# Patient Record
Sex: FEMALE | Race: WHITE | Employment: UNEMPLOYED | RURAL
[De-identification: names, ages, dates, MRNs, and addresses within clinical notes are randomized per-mention and may not be internally consistent; named-entity substitution may affect disease eponyms.]

---

## 2021-11-04 LAB — MAMMOGRAPHY, EXTERNAL: NORMAL

## 2022-07-27 LAB — HEMOCCULT STL QL: NEGATIVE

## 2022-12-06 ENCOUNTER — OFFICE VISIT (OUTPATIENT)
Dept: FAMILY MEDICINE CLINIC | Age: 62
End: 2022-12-06
Payer: COMMERCIAL

## 2022-12-06 VITALS
DIASTOLIC BLOOD PRESSURE: 72 MMHG | BODY MASS INDEX: 23.13 KG/M2 | OXYGEN SATURATION: 97 % | HEIGHT: 65 IN | WEIGHT: 138.8 LBS | HEART RATE: 61 BPM | RESPIRATION RATE: 18 BRPM | TEMPERATURE: 98.1 F | SYSTOLIC BLOOD PRESSURE: 110 MMHG

## 2022-12-06 DIAGNOSIS — G89.29 CHRONIC PAIN OF LEFT ANKLE: Primary | ICD-10-CM

## 2022-12-06 DIAGNOSIS — Z13.9 SCREENING DUE: ICD-10-CM

## 2022-12-06 DIAGNOSIS — M25.572 CHRONIC PAIN OF LEFT ANKLE: Primary | ICD-10-CM

## 2022-12-06 PROCEDURE — 99202 OFFICE O/P NEW SF 15 MIN: CPT | Performed by: FAMILY MEDICINE

## 2022-12-06 NOTE — PROGRESS NOTES
Identified pt with two pt identifiers(name and ). Chief Complaint   Patient presents with    Referral Request     Patient would like referral for OBGYN and mammogram     New Patient    Ankle Pain     Left ankle pain and swelling by the end of the day since august         Health Maintenance Due   Topic    Hepatitis C Screening     Depression Screen     Cervical cancer screen     Lipid Screen     Colorectal Cancer Screening Combo     Breast Cancer Screen Mammogram     COVID-19 Vaccine (3 - Booster for Pfizer series)    Flu Vaccine (1)       Wt Readings from Last 3 Encounters:   22 138 lb 12.8 oz (63 kg)     Temp Readings from Last 3 Encounters:   22 98.1 °F (36.7 °C) (Temporal)     BP Readings from Last 3 Encounters:   22 110/72     Pulse Readings from Last 3 Encounters:   22 61         Learning Assessment:  :     No flowsheet data found. Depression Screening:  :     3 most recent PHQ Screens 2022   Little interest or pleasure in doing things Not at all   Feeling down, depressed, irritable, or hopeless Not at all   Total Score PHQ 2 0       Fall Risk Assessment:  :     No flowsheet data found. Abuse Screening:  :     Abuse Screening Questionnaire 2022   Do you ever feel afraid of your partner? N   Are you in a relationship with someone who physically or mentally threatens you? N   Is it safe for you to go home? Y       Coordination of Care Questionnaire:  :     1) Have you been to an emergency room, urgent care clinic since your last visit? no   Hospitalized since your last visit? no             2) Have you seen or consulted any other health care providers outside of [address] [place] [address] since your last visit? yes  Shasta Burgess 2) MD Ranjith (Include any pap smears or colon screenings in this section.)    3) Do you have an Advance Directive on file? yes  Are you interested in receiving information about Advance Directives?  yes    Patient is accompanied by self I have received verbal consent from Araseli Sal to discuss any/all medical information while they are present in the room. 4.  For patients aged 39-70: Has the patient had a colonoscopy / FIT/ Cologuard? yes      If the patient is female:    5. For patients aged 41-77: Has the patient had a mammogram within the past 2 years? No      6. For patients aged 21-65: Has the patient had a pap smear?  No

## 2022-12-12 NOTE — PROGRESS NOTES
Andree Mendoza (: 1960) is a 58 y.o. female, new patient, here for evaluation of the following chief complaint(s):  New Patient and Ankle Pain (Left ankle pain and swelling by the end of the day since august )       ASSESSMENT/PLAN:  Below is the assessment and plan developed based on review of pertinent history, physical exam, labs, studies, and medications. 1. Chronic pain of left ankle  2. Screening due  -     [facility] MAMMO BI SCREENING INCL CAD; Future      No follow-ups on file. SUBJECTIVE/OBJECTIVE:  Andree Mendoza is a 58 y.o. female presents to the clinic to establish care, patient states that she is up to date on her preventative medicine, has had mammograms, colonoscopy, blood work. Denies any abnormal findings in the past. Is not on any medications. Complains of a left ankle pain after an injury in or around August, notices swelling of the ankle joint at the end of the day. Experiences pain during the first few steps in the morning, which resolves on its own. Review of Systems   Constitutional:  Negative for chills, fatigue, fever and unexpected weight change. HENT: Negative. Eyes: Negative. Respiratory: Negative. Cardiovascular:  Negative for leg swelling. Gastrointestinal: Negative. Endocrine: Negative. Genitourinary: Negative. Musculoskeletal:  Positive for arthralgias and joint swelling. Skin: Negative. Allergic/Immunologic: Negative. Neurological: Negative. Hematological: Negative. Psychiatric/Behavioral: Negative. Physical Exam  Constitutional:       Appearance: Normal appearance. She is normal weight. HENT:      Head: Normocephalic and atraumatic. Right Ear: External ear normal.      Left Ear: External ear normal.      Nose: Nose normal.      Mouth/Throat:      Mouth: Mucous membranes are dry. Pharynx: Oropharynx is clear. Eyes:      Extraocular Movements: Extraocular movements intact.       Conjunctiva/sclera: Conjunctivae normal.      Pupils: Pupils are equal, round, and reactive to light. Cardiovascular:      Rate and Rhythm: Normal rate and regular rhythm. Pulses: Normal pulses. Heart sounds: Normal heart sounds. Pulmonary:      Effort: Pulmonary effort is normal.      Breath sounds: Normal breath sounds. Abdominal:      General: Abdomen is flat. Bowel sounds are normal.   Musculoskeletal:         General: Swelling and tenderness present. Normal range of motion. Cervical back: Normal range of motion and neck supple. Skin:     General: Skin is warm. Capillary Refill: Capillary refill takes less than 2 seconds. Neurological:      General: No focal deficit present. Mental Status: She is alert and oriented to person, place, and time. Mental status is at baseline. Psychiatric:         Mood and Affect: Mood normal.         Behavior: Behavior normal.         Thought Content: Thought content normal.         Judgment: Judgment normal.           An electronic signature was used to authenticate this note.   -- Irwin Heredia MD show

## 2023-01-31 ENCOUNTER — HOSPITAL ENCOUNTER (OUTPATIENT)
Dept: MAMMOGRAPHY | Age: 63
Discharge: HOME | End: 2023-01-31
Attending: FAMILY MEDICINE
Payer: COMMERCIAL

## 2023-01-31 DIAGNOSIS — Z13.9 SCREENING DUE: ICD-10-CM

## 2023-01-31 PROCEDURE — 77063 BREAST TOMOSYNTHESIS BI: CPT

## 2023-12-28 ENCOUNTER — TELEPHONE (OUTPATIENT)
Age: 63
End: 2023-12-28

## 2023-12-28 NOTE — TELEPHONE ENCOUNTER
Pt calling saying that she has a UTI and would like an appointment for tomorrow. No available appts. Can she be fit in with any Dr tomorrow?

## 2023-12-29 ENCOUNTER — OFFICE VISIT (OUTPATIENT)
Age: 63
End: 2023-12-29

## 2023-12-29 VITALS
DIASTOLIC BLOOD PRESSURE: 75 MMHG | OXYGEN SATURATION: 99 % | BODY MASS INDEX: 23.63 KG/M2 | WEIGHT: 142 LBS | HEART RATE: 60 BPM | TEMPERATURE: 98.1 F | SYSTOLIC BLOOD PRESSURE: 115 MMHG

## 2023-12-29 DIAGNOSIS — N39.0 URINARY TRACT INFECTION WITHOUT HEMATURIA, SITE UNSPECIFIED: Primary | ICD-10-CM

## 2023-12-29 LAB
BILIRUBIN, URINE, POC: NEGATIVE
BLOOD URINE, POC: ABNORMAL
GLUCOSE URINE, POC: NEGATIVE
KETONES, URINE, POC: NEGATIVE
LEUKOCYTE ESTERASE, URINE, POC: ABNORMAL
NITRITE, URINE, POC: NEGATIVE
PH, URINE, POC: 5.5 (ref 4.6–8)
PROTEIN,URINE, POC: NEGATIVE
SPECIFIC GRAVITY, URINE, POC: 1.01 (ref 1–1.03)
URINALYSIS CLARITY, POC: CLEAR
URINALYSIS COLOR, POC: YELLOW
UROBILINOGEN, POC: NORMAL

## 2023-12-29 RX ORDER — NITROFURANTOIN MACROCRYSTALS 50 MG/1
50 CAPSULE ORAL 4 TIMES DAILY
Qty: 28 CAPSULE | Refills: 0 | Status: SHIPPED | OUTPATIENT
Start: 2023-12-29 | End: 2024-01-05

## 2023-12-29 NOTE — PROGRESS NOTES
Kulwinder Pisano (:  1960) is a 61 y.o. female,New patient, here for evaluation of the following chief complaint(s):  Urinary Tract Infection (Sx for 2 days Frequent urination , cloudy urine with burning.)      ASSESSMENT/PLAN:  Visit Diagnoses and Associated Orders       Urinary tract infection without hematuria, site unspecified    -  Primary    AMB POC URINALYSIS DIP STICK MANUAL W/O MICRO [05938 CPT(R)]      Urine culture (clean catch) [46043 Custom]   - Future Order    Urine culture (clean catch) [13238 Custom]      nitrofurantoin (MACRODANTIN) 50 MG capsule [5595]                  Results for orders placed or performed in visit on 23   AMB POC URINALYSIS DIP STICK MANUAL W/O MICRO   Result Value Ref Range    Color (UA POC) Yellow     Clarity (UA POC) Clear     Glucose, Urine, POC Negative     Bilirubin, Urine, POC Negative     Ketones, Urine, POC Negative     Specific Gravity, Urine, POC 1.015 1.001 - 1.035    Blood (UA POC) Hemolyzed Trace     pH, Urine, POC 5.5 4.6 - 8.0    Protein, Urine, POC Negative     Urobilinogen, POC Normal     Nitrite, Urine, POC Negative     Leukocyte Esterase, Urine, POC 1+       Thank you for choosing [facility]! A prescription for Macrodantin has been sent to your pharmacy. A urine culture was obtained. We will call you with your results and adjust your treatment if necessary according to your urine culture results. Increase your daily fluid intake. Avoid spicy foods and caffeine as these may cause bladder spasms. OTC AZO for urinary symptoms. Drink with a full glass of water. If your symptoms persist or become worse, or you start to run a fever of 100.6 degrees or greater that is not relieved with Tylenol and/or ibuprofen, call your primary care Dr. Korin Laughlin:  HPI     61 y.o. female presents with symptoms of   dysuria, urinary frequency and urgency. Onset of symptoms approximately 2 days ago.  Patient denies taking anything for her

## 2023-12-29 NOTE — PATIENT INSTRUCTIONS
Thank you for choosing [facility]! A prescription for Macrodantin has been sent to your pharmacy. A urine culture was obtained. We will call you with your results and adjust your treatment if necessary according to your urine culture results. Increase your daily fluid intake. Avoid spicy foods and caffeine as these may cause bladder spasms. OTC AZO for urinary symptoms. Drink with a full glass of water.     If your symptoms persist or become worse, or you start to run a fever of 100.6 degrees or greater that is not relieved with Tylenol and/or ibuprofen, call your primary care

## 2023-12-31 LAB
BACTERIA SPEC CULT: ABNORMAL
CC UR VC: ABNORMAL
SERVICE CMNT-IMP: ABNORMAL

## 2024-01-01 LAB
BACTERIA SPEC CULT: ABNORMAL
CC UR VC: ABNORMAL
SERVICE CMNT-IMP: ABNORMAL

## 2024-01-04 ENCOUNTER — TELEPHONE (OUTPATIENT)
Age: 64
End: 2024-01-04

## 2024-01-04 NOTE — TELEPHONE ENCOUNTER
Patient called to state that she is not feeling better, and was wondering if the medication that she was given is effective. Please advise. Thank you.